# Patient Record
Sex: MALE | Race: WHITE | NOT HISPANIC OR LATINO | Employment: FULL TIME | URBAN - NONMETROPOLITAN AREA
[De-identification: names, ages, dates, MRNs, and addresses within clinical notes are randomized per-mention and may not be internally consistent; named-entity substitution may affect disease eponyms.]

---

## 2023-02-16 ENCOUNTER — OFFICE VISIT (OUTPATIENT)
Dept: UROLOGY | Facility: CLINIC | Age: 28
End: 2023-02-16
Payer: MEDICAID

## 2023-02-16 VITALS — DIASTOLIC BLOOD PRESSURE: 93 MMHG | SYSTOLIC BLOOD PRESSURE: 164 MMHG | OXYGEN SATURATION: 98 % | HEART RATE: 104 BPM

## 2023-02-16 DIAGNOSIS — N46.9 MALE INFERTILITY: Primary | ICD-10-CM

## 2023-02-16 PROCEDURE — 99203 OFFICE O/P NEW LOW 30 MIN: CPT | Performed by: UROLOGY

## 2023-02-16 RX ORDER — ROSUVASTATIN CALCIUM 5 MG/1
1 TABLET, COATED ORAL DAILY
COMMUNITY
Start: 2023-01-25

## 2023-02-16 RX ORDER — LEVOTHYROXINE SODIUM 0.03 MG/1
TABLET ORAL
COMMUNITY
Start: 2023-01-25

## 2023-02-16 NOTE — PROGRESS NOTES
Chief Complaint:      Chief Complaint   Patient presents with   • Infertility       HPI:   27 y.o. male accompanied by his wife with no fertility has been trying for 5 years his wife has PCOS.  She sees Dr. Tinajero, he has good erections no headaches no double visions he had surgery for leg Perthes disease has a history of thyroid disease normal testicles I will check a semen analysis as a first step    Past Medical History:     Past Medical History:   Diagnosis Date   • Hyperlipidemia    • Thyroid condition        Current Meds:     Current Outpatient Medications   Medication Sig Dispense Refill   • levothyroxine (SYNTHROID, LEVOTHROID) 25 MCG tablet      • rosuvastatin (CRESTOR) 5 MG tablet Take 1 tablet by mouth Daily.       No current facility-administered medications for this visit.        Allergies:      No Known Allergies     Past Surgical History:     History reviewed. No pertinent surgical history.    Social History:     Social History     Socioeconomic History   • Marital status:    Tobacco Use   • Smoking status: Some Days     Packs/day: 0.50     Years: 6.00     Pack years: 3.00     Types: Cigarettes, Electronic Cigarette   • Tobacco comments:     I also dip tobacco daily   Substance and Sexual Activity   • Alcohol use: Yes     Alcohol/week: 4.0 standard drinks     Types: 4 Cans of beer per week     Comment: Only on occasion   • Drug use: Never   • Sexual activity: Yes     Partners: Female     Birth control/protection: None       Family History:     Family History   Problem Relation Age of Onset   • No Known Problems Father    • No Known Problems Mother        Review of Systems:     Review of Systems   Constitutional: Negative.    HENT: Negative.    Eyes: Negative.    Respiratory: Negative.    Cardiovascular: Negative.    Gastrointestinal: Negative.    Endocrine: Negative.    Musculoskeletal: Negative.    Allergic/Immunologic: Negative.    Neurological: Negative.    Hematological: Negative.     Psychiatric/Behavioral: Negative.        Physical Exam:     Physical Exam  Vitals and nursing note reviewed.   Constitutional:       Appearance: He is well-developed.   HENT:      Head: Normocephalic and atraumatic.   Eyes:      Conjunctiva/sclera: Conjunctivae normal.      Pupils: Pupils are equal, round, and reactive to light.   Cardiovascular:      Rate and Rhythm: Normal rate and regular rhythm.      Heart sounds: Normal heart sounds.   Pulmonary:      Effort: Pulmonary effort is normal.      Breath sounds: Normal breath sounds.   Abdominal:      General: Bowel sounds are normal.      Palpations: Abdomen is soft.   Musculoskeletal:         General: Normal range of motion.      Cervical back: Normal range of motion.   Skin:     General: Skin is warm and dry.   Neurological:      Mental Status: He is alert and oriented to person, place, and time.      Deep Tendon Reflexes: Reflexes are normal and symmetric.   Psychiatric:         Behavior: Behavior normal.         Thought Content: Thought content normal.         Judgment: Judgment normal.         I have reviewed the following portions of the patient's history: Allergies, current medications, past family history, past medical history, past social history, past surgical history, problem list, and ROS and confirm it is accurate.    Recent Image (CT and/or KUB):      CT Abdomen and Pelvis: No results found for this or any previous visit.       CT Stone Protocol: No results found for this or any previous visit.       KUB: No results found for this or any previous visit.       Labs (past 3 months):      No results found for any previous visit.        Procedure:       Assessment/Plan:   Male infertility-check semen analysis            This document has been electronically signed by AXEL NOBLE MD February 16, 2023 08:12 EST    Dictated Utilizing Dragon Dictation: Part of this note may be an electronic transcription/translation of spoken language to printed  text using the Dragon Dictation System.

## 2023-02-19 PROBLEM — N46.9 MALE INFERTILITY: Status: ACTIVE | Noted: 2023-02-19

## 2023-03-10 ENCOUNTER — LAB (OUTPATIENT)
Dept: UROLOGY | Facility: CLINIC | Age: 28
End: 2023-03-10
Payer: MEDICAID

## 2023-03-10 ENCOUNTER — LAB (OUTPATIENT)
Dept: LAB | Facility: HOSPITAL | Age: 28
End: 2023-03-10
Payer: MEDICAID

## 2023-03-10 DIAGNOSIS — N46.9 MALE INFERTILITY: Primary | ICD-10-CM

## 2023-03-10 LAB
CHARACTER SMN: ABNORMAL
COLOR SMN: ABNORMAL
FORWARD PROGRESSION: ABNORMAL
ROUND CELLS, SEMINAL FLUID: ABNORMAL /HPF
SPECIMEN VOL SMN: 0.8 ML (ref 2–5)
SPERM # SMN: 27.8 MILLIONS/ML (ref 20–999)
SPERM MOTILE NFR SMN: 35 % MOTILE (ref 41–100)
SPERM PRECURSORS/100 SPERM: 0 /100 SPERM
SPERM SMN: 13 % NORMAL (ref 30–100)
VIABLE CELLS NFR SPEC: ABNORMAL %
VISC SMN: ABNORMAL CP
WBC/100 SPERM: 0 /100 SPERM

## 2023-03-10 PROCEDURE — 89320 SEMEN ANAL VOL/COUNT/MOT: CPT

## 2023-05-28 ENCOUNTER — APPOINTMENT (OUTPATIENT)
Dept: GENERAL RADIOLOGY | Facility: HOSPITAL | Age: 28
End: 2023-05-28

## 2023-05-28 ENCOUNTER — HOSPITAL ENCOUNTER (EMERGENCY)
Facility: HOSPITAL | Age: 28
Discharge: HOME | End: 2023-05-28
Attending: STUDENT IN AN ORGANIZED HEALTH CARE EDUCATION/TRAINING PROGRAM | Admitting: STUDENT IN AN ORGANIZED HEALTH CARE EDUCATION/TRAINING PROGRAM

## 2023-05-28 VITALS
OXYGEN SATURATION: 98 % | DIASTOLIC BLOOD PRESSURE: 93 MMHG | SYSTOLIC BLOOD PRESSURE: 148 MMHG | TEMPERATURE: 97.9 F | RESPIRATION RATE: 19 BRPM | BODY MASS INDEX: 42.66 KG/M2 | WEIGHT: 315 LBS | HEART RATE: 96 BPM | HEIGHT: 72 IN

## 2023-05-28 DIAGNOSIS — M25.571 CHRONIC PAIN OF RIGHT ANKLE: ICD-10-CM

## 2023-05-28 DIAGNOSIS — G89.29 CHRONIC PAIN OF RIGHT ANKLE: ICD-10-CM

## 2023-05-28 DIAGNOSIS — M77.9 BONE SPUR: Primary | ICD-10-CM

## 2023-05-28 PROCEDURE — 73610 X-RAY EXAM OF ANKLE: CPT

## 2023-05-28 PROCEDURE — 99283 EMERGENCY DEPT VISIT LOW MDM: CPT

## 2023-05-28 NOTE — Clinical Note
[facility] EMERGENCY DEPARTMENT  [address] [address]  Phone: [phone]    Reji Aguayo was seen and treated in our emergency department on 5/28/2023.  He may return to work on 05/29/2023.  Attempt to use boot when possible and stay off feet or do light duty until further evaluated.        Thank you for choosing [facility].    Aarti Hammonds PA

## 2023-05-28 NOTE — Clinical Note
[facility] EMERGENCY DEPARTMENT  [address] [facility] [address]  Phone: [phone]    Reji Aguayo was seen and treated in our emergency department on 5/28/2023.  He may return to work on 05/29/2023.  Attempt to use boot when possible and stay off feet or do light duty until further evaluated.        Thank you for choosing [facility].    Aarti Hammonds PA

## 2023-05-29 NOTE — ED PROVIDER NOTES
"Subjective   History of Present Illness  26 yo male patient with hx of HLD, HTN, and hypothyroidism presents to the ED for chronic right ankle pain. Patient states, \"I have been having right ankle pain x6-7 months with worsening last night. I have been to multiple ER's and PCP's. I have been referred to Orthopedic doctors but have not been able to go to the appointments due to car troubles.\" Pt states that he has not had injury.  Patient states that he wears boots to work and stands for 12 hours.  Patient denies any alleviating factors but standing and movement worsens symptoms.     History provided by:  Patient      Review of Systems   Constitutional: Negative.    HENT: Negative.    Eyes: Negative.    Respiratory: Negative.    Cardiovascular: Negative.    Gastrointestinal: Negative.    Endocrine: Negative.    Genitourinary: Negative.    Musculoskeletal:        +R ankle pain    Allergic/Immunologic: Negative.    Neurological: Negative.    Hematological: Negative.    Psychiatric/Behavioral: Negative.    All other systems reviewed and are negative.      Past Medical History:   Diagnosis Date   • Hyperlipidemia    • Thyroid condition        No Known Allergies    No past surgical history on file.    Family History   Problem Relation Age of Onset   • No Known Problems Father    • No Known Problems Mother        Social History     Socioeconomic History   • Marital status:    Tobacco Use   • Smoking status: Some Days     Packs/day: 0.50     Years: 6.00     Pack years: 3.00     Types: Cigarettes, Electronic Cigarette   • Tobacco comments:     I also dip tobacco daily   Substance and Sexual Activity   • Alcohol use: Yes     Alcohol/week: 4.0 standard drinks     Types: 4 Cans of beer per week     Comment: Only on occasion   • Drug use: Never   • Sexual activity: Yes     Partners: Female     Birth control/protection: None           Objective   Physical Exam  Vitals and nursing note reviewed.   Constitutional:       " Appearance: Normal appearance. He is normal weight.   HENT:      Head: Normocephalic and atraumatic.      Right Ear: External ear normal.      Left Ear: External ear normal.      Nose: Nose normal.      Mouth/Throat:      Mouth: Mucous membranes are moist.      Pharynx: Oropharynx is clear.   Eyes:      Extraocular Movements: Extraocular movements intact.      Conjunctiva/sclera: Conjunctivae normal.      Pupils: Pupils are equal, round, and reactive to light.   Cardiovascular:      Rate and Rhythm: Normal rate and regular rhythm.      Pulses: Normal pulses.      Heart sounds: Normal heart sounds.   Pulmonary:      Effort: Pulmonary effort is normal.      Breath sounds: Normal breath sounds.   Abdominal:      General: Abdomen is flat. Bowel sounds are normal.      Palpations: Abdomen is soft.   Musculoskeletal:         General: Normal range of motion.      Cervical back: Normal range of motion and neck supple.   Skin:     General: Skin is warm and dry.      Capillary Refill: Capillary refill takes less than 2 seconds.   Neurological:      General: No focal deficit present.      Mental Status: He is alert. Mental status is at baseline.   Psychiatric:         Mood and Affect: Mood normal.         Behavior: Behavior normal.         Thought Content: Thought content normal.         Judgment: Judgment normal.         Procedures           ED Course  ED Course as of 05/28/23 2210   Sun May 28, 2023   2208 XR Ankle 3+ View Right  IMPRESSION:     1.  No acute fracture or dislocation.  2.  Mild soft tissue swelling noted overlying the lateral malleolus.  3.  Mild soft tissue swelling noted anterior to the tibiotalar joint.  4.  Small tibiotalar joint effusion.  5.  Very small plantar spur.     This report was finalized on 5/28/2023 10:06 PM by Jaime Lyons MD.           Specimen Collected: 05/28/23 22:05 EDT Last Resulted: 05/28/23 22:06 EDT         [ML]      ED Course User Index  [ML] Aarti Hammonds PA               "                             Medical Decision Making  26 yo male patient with hx of HLD, HTN, and hypothyroidism presents to the ED for chronic right ankle pain. Patient states, \"I have been having right ankle pain x6-7 months with worsening last night. I have been to multiple ER's and PCP's. I have been referred to Orthopedic doctors but have not been able to go to the appointments due to car troubles.\" Pt states that he has not had injury.  Patient states that he wears boots to work and stands for 12 hours.  Patient denies any alleviating factors but standing and movement worsens symptoms. Pt instructed to f/u with podiatry. Discussed sx and red flags that would warrant return to the ED.      Bone spur: acute illness or injury  Chronic pain of right ankle: acute illness or injury  Amount and/or Complexity of Data Reviewed  Radiology: ordered. Decision-making details documented in ED Course.          Final diagnoses:   Bone spur   Chronic pain of right ankle       ED Disposition  ED Disposition     ED Disposition   Discharge    Condition   Stable    Comment   --             Alison Parks, APRN  [address] [facility] [address] [phone]    Schedule an appointment as soon as possible for a visit in 1 day      Pablo Rust MD  [address] [facility]   [facility] [address] [phone]    Schedule an appointment as soon as possible for a visit in 1 day           Medication List      No changes were made to your prescriptions during this visit.          Aarti Hammonds PA  05/28/23 3090    "